# Patient Record
(demographics unavailable — no encounter records)

---

## 2024-11-20 NOTE — ASSESSMENT
[FreeTextEntry1] : No evidence of acute bacterial sinusitis.  Some mild inflammation of sinus mucosa.  Restart Mometasone rinses.  Continue nasal regimen.  Headaches could be related to lung infection which is slowly resolving. Seek attention for epistaxis, nasal discharge, facial pain/pressure, fever, chills, headache. F/u PRN

## 2024-11-20 NOTE — CONSULT LETTER
[Courtesy Letter:] : I had the pleasure of seeing your patient, [unfilled], in my office today. [Please see my note below.] : Please see my note below. [Sincerely,] : Sincerely, [Dear  ___] : Dear  [unfilled], [FreeTextEntry2] : Dr. Rasheed Hannah [FreeTextEntry3] : Han Castro MD Otolaryngology at [address], [address] [place] [address] Phone: [phone] Fax: [phone]

## 2024-11-20 NOTE — HISTORY OF PRESENT ILLNESS
[de-identified] : Update 11/20/2024: 74 year old male here for sinusitus s/p COVID in 9/2024 treated with paxlovid, then pt had sinus infection in 10/2024 treated with augmentin, pt then reports viral lung infection treated with doxycycline, cough medicine and 2 rounds prednisone. Reports nasal congestion, sinus pain and pressure, headaches. Denies anterior rhinorrhea, PND, anosmia. Pt currently using Flonase daily, and Azelestine daily, Treligy daily. Denies recent CT scan of sinuses   73M presenting for f/u CRS. He has been using mometasone rinses once daily with great improvement in his daily symptoms. Still with occasional HA, usually in humid weather, but otherwise congestion and rhinorrhea dramatically improved. Has not had an allergy season this good in decades.

## 2024-11-20 NOTE — PROCEDURE
[FreeTextEntry6] : Nasal Endoscopy: Nasal Endoscopy: Procedure: Bilateral nasal endoscopy (CPT 90780) Indication: Anterior rhinoscopy was inadequate to evaluate pathology.  Left: scan thick mucous in left maxillary sinus  resection of L inf turbinate Septum midline ethmoid cavity clean Frontal appears open without purulence  Right: Septum midline, R inf turb resected max antrostomy open unable to visualize frontal Ethmoids clean.  Scant thick mucous. Anesthesia: none. Patient tolerated well. No complications.

## 2025-03-21 NOTE — HISTORY OF PRESENT ILLNESS
[de-identified] : Patient is a 75-year-old male presenting for evaluation of right knee pain localized medially. He states that he is an avid walker and walks 4 miles a day. His pain is worse with walking distance and with deep flexion of the knee. Patient has not had any intra-articular injections. He has not had an MRI. Patient has tried physical therapy and entire inflammatories without relief. He tried Euflexxa injections last April which worked well. He also reports having left hip pain which is localized laterally. He had a Toradol injection which provided relief.

## 2025-03-21 NOTE — PHYSICAL EXAM
[de-identified] :  The patient appears well nourished and in no apparent distress. The patient is alert and oriented to person, place, and time. Affect and mood appear normal. The head is normocephalic and atraumatic. The eyes reveal normal sclera and extra ocular muscles are intact. The tongue is midline with no apparent lesions. Skin shows normal turgor with no evidence of eczema or psoriasis. No respiratory distress noted. Sensation grossly intact. [de-identified] :  Exam of the left knee shows 0 to 125 degrees of flexion measured with a goniometer. There is tenderness along the IT band. 5/5 motor strength bilaterally distally. Sensation intact distally. [de-identified] : X-ray: 3 views of the left knee demonstrate a total knee replacement in good alignment with a well centered patella, without evidence of loosening or subsidence, and no fracture. X-ray 4 views right knee demonstrate moderate medial compartment osteoarthritis as well as moderate patellofemoral compartment osteoarthritis.  X-ray: AP of the pelvis and 2 views of the left hip demonstrate well preserved joint space

## 2025-03-21 NOTE — DISCUSSION/SUMMARY
[de-identified] :  JOSE J BABB is a 75 year old male who presents with left IT band pain. A prescription for physical therapy was provided to help with his left IT band pain.  He will also start taking Turmeric as he cannot take oral nsaids with his cardiac history.